# Patient Record
Sex: FEMALE | Race: WHITE | NOT HISPANIC OR LATINO | Employment: FULL TIME | URBAN - METROPOLITAN AREA
[De-identification: names, ages, dates, MRNs, and addresses within clinical notes are randomized per-mention and may not be internally consistent; named-entity substitution may affect disease eponyms.]

---

## 2022-02-02 ENCOUNTER — TELEPHONE (OUTPATIENT)
Dept: PSYCHIATRY | Facility: CLINIC | Age: 37
End: 2022-02-02
Payer: COMMERCIAL

## 2022-02-02 NOTE — TELEPHONE ENCOUNTER
----- Message from Josie Malone LPN sent at 2/2/2022  1:41 PM CST -----    ----- Message -----  From: Sumaya Cardozo  Sent: 2/2/2022  12:29 PM CST  To: [facility] Clinical Staff    States she would like to schedule an appt w/ Ms Rosey Wheatley, on 2/17 between 10 &1. Please call pt [phone]. Thank you

## 2025-06-26 ENCOUNTER — TELEPHONE (OUTPATIENT)
Dept: RHEUMATOLOGY | Facility: CLINIC | Age: 40
End: 2025-06-26

## 2025-06-26 NOTE — TELEPHONE ENCOUNTER
Copied from CRM #[id number]. Topic: General Inquiry - Return Call  >> Jun 26, 2025 12:11 PM Dania wrote:  Type:  Patient Returning Call    Who Called:Elli Celeste  Who Left Message for Patient:unsure  Does the patient know what this is regarding?:scheduling  Would the patient rather a call back or a response via [facility]? Call back  Best Call Back Number:[phone]  Additional Information: n/a

## 2025-06-26 NOTE — TELEPHONE ENCOUNTER
Did you receive a referral on this patient for +JULIO and family History sarcoid and RA?  She said that someone called her. She is requesting a Wednesday only appt